# Patient Record
Sex: MALE | Race: WHITE | URBAN - METROPOLITAN AREA
[De-identification: names, ages, dates, MRNs, and addresses within clinical notes are randomized per-mention and may not be internally consistent; named-entity substitution may affect disease eponyms.]

---

## 2023-02-13 ENCOUNTER — HOSPITAL ENCOUNTER (EMERGENCY)
Facility: OTHER | Age: 38
Discharge: HOME | End: 2023-02-13
Attending: EMERGENCY MEDICINE
Payer: MEDICAID

## 2023-02-13 VITALS
HEART RATE: 79 BPM | RESPIRATION RATE: 17 BRPM | DIASTOLIC BLOOD PRESSURE: 68 MMHG | OXYGEN SATURATION: 95 % | SYSTOLIC BLOOD PRESSURE: 115 MMHG | TEMPERATURE: 99 F

## 2023-02-13 DIAGNOSIS — T40.601A OPIATE OVERDOSE, ACCIDENTAL OR UNINTENTIONAL, INITIAL ENCOUNTER: Primary | ICD-10-CM

## 2023-02-13 PROCEDURE — 99283 EMERGENCY DEPT VISIT LOW MDM: CPT

## 2023-02-13 RX ORDER — NALOXONE HYDROCHLORIDE 4 MG/.1ML
SPRAY NASAL
Qty: 1 EACH | Refills: 11 | Status: SHIPPED | OUTPATIENT
Start: 2023-02-13

## 2023-02-13 NOTE — ED NOTES
Pt to ED via EMS, found down, +response to Narcan, currently AAOx4, protecting own airways. NAD noted.

## 2023-02-13 NOTE — ED PROVIDER NOTES
Encounter Date: 2/13/2023       History     Chief Complaint   Patient presents with    Altered Mental Status     Neighbor found pt down, EMS gave narcan 0.5 IV narcan, +response. Pt alert in triage, delayed responses. CBG 99. States he snorted fentanyl     37-year-old male with history of psychiatric disorder, drug abuse brought by EMS after suspected opiate overdose.  Patient admits to taking his psychiatric medications this morning, was sitting on the porch and snorted some fentanyl.  His neighbor found him unresponsive and called EMS, who gave Narcan with good response. No known injuries, he has otherwise been at baseline recently.  He denies any alcohol or other drug use, no current complaints.  He denies any psychiatric complaints such as suicidal ideations or hallucinations.    Review of patient's allergies indicates:   Allergen Reactions    Paxil [paroxetine hcl]      No past medical history on file.  No past surgical history on file.  No family history on file.     Review of Systems   Constitutional:  Negative for fever.   HENT:  Negative for congestion.    Eyes:  Negative for redness.   Respiratory:  Negative for shortness of breath.    Cardiovascular:  Negative for chest pain.   Gastrointestinal:  Negative for abdominal pain.   Genitourinary:  Negative for dysuria.   Skin:  Negative for rash.   Neurological:  Positive for syncope. Negative for headaches.   Psychiatric/Behavioral:  Negative for confusion.      Physical Exam     Initial Vitals [02/13/23 1319]   BP Pulse Resp Temp SpO2   106/65 78 (!) 22 98.6 °F (37 °C) (!) 93 %      MAP       --         Physical Exam    Nursing note and vitals reviewed.  Constitutional: He is not diaphoretic. No distress.   Disheveled   HENT:   Head: Normocephalic and atraumatic.   Eyes: Conjunctivae are normal. No scleral icterus.   Neck: Neck supple.   Cardiovascular:  Normal rate, regular rhythm, normal heart sounds and intact distal pulses.           No murmur  Surgery heard.  Pulmonary/Chest: Breath sounds normal. No respiratory distress. He has no wheezes. He has no rhonchi. He has no rales.   Musculoskeletal:         General: No edema.      Cervical back: Neck supple.      Comments: No sign of cellulitis on extremities     Neurological: He is alert and oriented to person, place, and time.   Skin: Skin is warm and dry.       ED Course   Procedures  Labs Reviewed - No data to display       Imaging Results    None          Medications - No data to display  Medical Decision Making:   Initial Assessment:       37-year-old male with history of psychiatric disorder, drug abuse brought by EMS after suspected opiate overdose.  Patient admits to taking his psychiatric medications this morning and then snorting some fentanyl, was reportedly found unresponsive by his neighbor who called EMS.  Per EMS patient was unresponsive to vigorous stimulation but maintaining O2 sat, after 0.5 Narcan he became alert.  He is currently asymptomatic, denies any other alcohol or drug use, has been compliant with psychiatric medications with no related complaints such as SI/hallucinations.  No recent illness, he is afebrile with normal vitals on exam.  No sign of trauma or injury, he is normal neuro exam with no sign of intracranial hemorrhage or indication for emergent imaging.  Will monitor patient for any recurrent apnea related to opiate overdose.    Patient monitored in the ED for over 2 hours after Narcan with no recurrent apnea or need for additional Narcan, he remained at normal baseline and asymptomatic.  Patient requesting discharge since he has to care for his service dog, so he was advised on cessation and prescribed Narcan p.r.n., referred to outpatient rehab.                            Clinical Impression:   Final diagnoses:  [T40.601A] Opiate overdose, accidental or unintentional, initial encounter (Primary)        ED Disposition Condition    Discharge Stable          ED Prescriptions        Medication Sig Dispense Start Date End Date Auth. Provider    naloxone (NARCAN) 4 mg/actuation Spry 4mg by nasal route as needed for opioid overdose; may repeat every 2-3 minutes in alternating nostrils until medical help arrives. Call 911 1 each 2/13/2023 -- Eugenio Fernandez MD          Follow-up Information       Follow up With Specialties Details Why Contact Info    [facility] - Emergency Dept Emergency Medicine Go to  As needed [id number] [facility] [id number] [phone]             Eugenio Fernandez MD  02/13/23 9187